# Patient Record
Sex: MALE | Race: WHITE | NOT HISPANIC OR LATINO | Employment: FULL TIME | URBAN - METROPOLITAN AREA
[De-identification: names, ages, dates, MRNs, and addresses within clinical notes are randomized per-mention and may not be internally consistent; named-entity substitution may affect disease eponyms.]

---

## 2017-02-06 ENCOUNTER — OFFICE VISIT (OUTPATIENT)
Dept: FAMILY MEDICINE | Facility: CLINIC | Age: 27
End: 2017-02-06
Payer: COMMERCIAL

## 2017-02-06 VITALS
HEIGHT: 73 IN | HEART RATE: 75 BPM | BODY MASS INDEX: 22.13 KG/M2 | TEMPERATURE: 98.1 F | WEIGHT: 167 LBS | DIASTOLIC BLOOD PRESSURE: 80 MMHG | SYSTOLIC BLOOD PRESSURE: 118 MMHG

## 2017-02-06 DIAGNOSIS — Z00.00 ROUTINE GENERAL MEDICAL EXAMINATION AT A HEALTH CARE FACILITY: Primary | ICD-10-CM

## 2017-02-06 DIAGNOSIS — R35.0 URINARY FREQUENCY: ICD-10-CM

## 2017-02-06 DIAGNOSIS — I78.1 NON-NEOPLASTIC NEVUS: ICD-10-CM

## 2017-02-06 DIAGNOSIS — Z23 NEED FOR VACCINATION: ICD-10-CM

## 2017-02-06 DIAGNOSIS — N39.0 URINARY TRACT INFECTION WITH HEMATURIA, SITE UNSPECIFIED: ICD-10-CM

## 2017-02-06 DIAGNOSIS — R31.9 URINARY TRACT INFECTION WITH HEMATURIA, SITE UNSPECIFIED: ICD-10-CM

## 2017-02-06 LAB
ALBUMIN UR-MCNC: ABNORMAL MG/DL
APPEARANCE UR: CLEAR
BACTERIA #/AREA URNS HPF: ABNORMAL /HPF
BILIRUB UR QL STRIP: NEGATIVE
COLOR UR AUTO: YELLOW
GLUCOSE UR STRIP-MCNC: NEGATIVE MG/DL
HGB UR QL STRIP: NEGATIVE
KETONES UR STRIP-MCNC: NEGATIVE MG/DL
LEUKOCYTE ESTERASE UR QL STRIP: ABNORMAL
MUCOUS THREADS #/AREA URNS LPF: PRESENT /LPF
NITRATE UR QL: NEGATIVE
NON-SQ EPI CELLS #/AREA URNS LPF: ABNORMAL /LPF
PH UR STRIP: 8.5 PH (ref 5–7)
RBC #/AREA URNS AUTO: ABNORMAL /HPF (ref 0–2)
SP GR UR STRIP: 1.02 (ref 1–1.03)
URN SPEC COLLECT METH UR: ABNORMAL
UROBILINOGEN UR STRIP-ACNC: 0.2 EU/DL (ref 0.2–1)
WBC #/AREA URNS AUTO: ABNORMAL /HPF (ref 0–2)

## 2017-02-06 PROCEDURE — 90715 TDAP VACCINE 7 YRS/> IM: CPT | Performed by: FAMILY MEDICINE

## 2017-02-06 PROCEDURE — 90686 IIV4 VACC NO PRSV 0.5 ML IM: CPT | Performed by: FAMILY MEDICINE

## 2017-02-06 PROCEDURE — 99212 OFFICE O/P EST SF 10 MIN: CPT | Mod: 25 | Performed by: FAMILY MEDICINE

## 2017-02-06 PROCEDURE — 90472 IMMUNIZATION ADMIN EACH ADD: CPT | Performed by: FAMILY MEDICINE

## 2017-02-06 PROCEDURE — 87086 URINE CULTURE/COLONY COUNT: CPT | Performed by: FAMILY MEDICINE

## 2017-02-06 PROCEDURE — 99385 PREV VISIT NEW AGE 18-39: CPT | Mod: 25 | Performed by: FAMILY MEDICINE

## 2017-02-06 PROCEDURE — 81001 URINALYSIS AUTO W/SCOPE: CPT | Performed by: FAMILY MEDICINE

## 2017-02-06 PROCEDURE — 90471 IMMUNIZATION ADMIN: CPT | Performed by: FAMILY MEDICINE

## 2017-02-06 RX ORDER — CIPROFLOXACIN 500 MG/1
500 TABLET, FILM COATED ORAL 2 TIMES DAILY
Qty: 14 TABLET | Refills: 0 | Status: SHIPPED | OUTPATIENT
Start: 2017-02-06 | End: 2017-02-13

## 2017-02-06 NOTE — MR AVS SNAPSHOT
After Visit Summary   2/6/2017    Nathen Mcdermott    MRN: [id number]           Patient Information     Date Of Birth          1990        Visit Information        Provider Department      2/6/2017 8:00 AM Yoon Hahn MD [facility]        Today's Diagnoses     Routine general medical examination at a health care facility    -  1     Urinary frequency         Non-neoplastic nevus         Nonspecific finding on examination of urine         Need for vaccination         Need for prophylactic vaccination and inoculation against influenza         Urinary tract infection with hematuria, site unspecified           Care Instructions      Preventive Health Recommendations  Male Ages 26 - 39    Yearly exam:             See your health care provider every year in order to  o   Review health changes.   o   Discuss preventive care.    o   Review your medicines if your doctor has prescribed any.    You should be tested each year for STDs (sexually transmitted diseases), if you re at risk.     After age 35, talk to your provider about cholesterol testing. If you are at risk for heart disease, have your cholesterol tested at least every 5 years.     If you are at risk for diabetes, you should have a diabetes test (fasting glucose).  Shots: Get a flu shot each year. Get a tetanus shot every 10 years.     Nutrition:    Eat at least 5 servings of fruits and vegetables daily.     Eat whole-grain bread, whole-wheat pasta and brown rice instead of white grains and rice.     Talk to your provider about Calcium and Vitamin D.     Lifestyle    Exercise for at least 150 minutes a week (30 minutes a day, 5 days a week). This will help you control your weight and prevent disease.     Limit alcohol to one drink per day.     No smoking.     Wear sunscreen to prevent skin cancer.     See your dentist every six months for an exam and cleaning.             Follow-ups after your visit        Additional Services      SKIN CARE REFERRAL       Your provider has referred you to: FM: [facility] [place] [phone]   [email or web address]     Please be aware that coverage of these services is subject to the terms and limitations of your health insurance plan.  Please check with your insurance prior to the appointment to ensure appropriate coverage for any services considered cosmetic in nature or not medically necessary.    Please bring the following with you to your appointment:    (1) Any X-Rays, CTs or MRIs which have been performed.  Contact the facility where they were done to arrange for  prior to your scheduled appointment.  Any new CT, MRI or other procedures ordered by your specialist must be performed at a [facility] facility or coordinated by your clinic's referral office.  (2) List of current medications  (3) This referral request   (4) Any documents/labs given to you for this referral                  Who to contact     If you have questions or need follow up information about today's clinic visit or your schedule please contact [facility] [place] directly at [phone].  Normal or non-critical lab and imaging results will be communicated to you by MyChart, letter or phone within 4 business days after the clinic has received the results. If you do not hear from us within 7 days, please contact the clinic through [employer] or phone. If you have a critical or abnormal lab result, we will notify you by phone as soon as possible.  Submit refill requests through [employer] or call your pharmacy and they will forward the refill request to us. Please allow 3 business days for your refill to be completed.          Additional Information About Your Visit        [employer] Information     [employer] lets you send messages to your doctor, view your test results, renew your prescriptions, schedule appointments and more. To sign up, go to [email or web address] Click on \"Log in\" on the left side of the screen, which will take you to the Welcome page. Then click on \"Sign up Now\" on the right side of the page.     You will be asked to enter the access code listed below, as well as some personal information. Please follow the directions to create your username and password.     Your access code is: [email or web address]  Expires: 2017  9:14 AM     Your access code will  in 90 days. If you need help or a new code, please call your [facility] clinic or [phone].        Care EveryWhere ID     This is your Care EveryWhere ID. This could be used by other organizations to access your [facility] medical records  [id number]        Your Vitals Were     Pulse Temperature Height BMI (Body Mass Index)          75 98.1  F (36.7  C) (Tympanic) 6' 1\" (1.854 m) 22.04 kg/m2         Blood Pressure from Last 3 Encounters:   17 118/80    Weight from Last 3 Encounters:   17 167 lb (75.751 kg)              We Performed the Following     1st  Administration  [23600]     Each additional admin.  (Right click and add QUANTITY)  [28168]     FLU VAC, SPLIT VIRUS IM > 3 YO (QUADRIVALENT) [36894]     SKIN CARE REFERRAL     TDAP (ADACEL AGES 11-64) [94723.002]     UA reflex to Microscopic     Urine Culture Aerobic Bacterial     Urine Microscopic          Today's Medication Changes          These changes are accurate as of: 17  9:14 AM.  If you have any questions, ask your nurse or doctor.               Start taking these medicines.        Dose/Directions    ciprofloxacin 500 MG tablet   Commonly known as:  CIPRO   Used for:  Urinary tract infection with hematuria, site unspecified   Started by:  Yoon Hahn MD        Dose:  500 mg   Take 1 tablet (500 mg) by mouth 2 times daily for 7 days   Quantity:  14 tablet   Refills:  0            Where to get your medicines      These medications were sent to [id number] - [place], [place] - [address]  AT [facility] OF [facility] [address] " [facility]  [address], [place] [address]     Phone:  [phone]    - ciprofloxacin 500 MG tablet             Primary Care Provider    None Specified       No primary provider on file.        Thank you!     Thank you for choosing [facility] [place]  for your care. Our goal is always to provide you with excellent care. Hearing back from our patients is one way we can continue to improve our services. Please take a few minutes to complete the written survey that you may receive in the mail after your visit with us. Thank you!             Your Updated Medication List - Protect others around you: Learn how to safely use, store and throw away your medicines at www.disposemymeds.org.          This list is accurate as of: 2/6/17  9:14 AM.  Always use your most recent med list.                   Brand Name Dispense Instructions for use    ciprofloxacin 500 MG tablet    CIPRO    14 tablet    Take 1 tablet (500 mg) by mouth 2 times daily for 7 days

## 2017-02-06 NOTE — PROGRESS NOTES
"Chief Complaint   Patient presents with     Physical     not fasting       Initial /80 mmHg  Pulse 75  Temp(Src) 98.1  F (36.7  C) (Tympanic)  Ht 6' 1\" (1.854 m)  Wt 167 lb (75.751 kg)  BMI 22.04 kg/m2 Estimated body mass index is 22.04 kg/(m^2) as calculated from the following:    Height as of this encounter: 6' 1\" (1.854 m).    Weight as of this encounter: 167 lb (75.751 kg).  Medication Reconciliation: complete  SUBJECTIVE:     CC: Nathen Mcdermott is an 26 year old male who presents for preventative health visit.     Healthy Habits:    Do you get at least three servings of calcium containing foods daily (dairy, green leafy vegetables, etc.)? yes    Amount of exercise or daily activities, outside of work: few days a week for 1 hour     Problems taking medications regularly not applicable    Medication side effects: No    Have you had an eye exam in the past two years? yes    Do you see a dentist twice per year? yes    Do you have sleep apnea, excessive snoring or daytime drowsiness?no        Urinary () - Male  Duration of complaint: 1 month   Painful urination, high frequency   Denies any penile discharge. Denies any rash. He sexually active. Denies any concerns about STDs.      C/o off and on sneezing. When he sneezes, he feels discomfort in the middle of the chest. It resolved quickly.   He does have seasonal allergies for which he uses as needed OTC anti allergy medication.    Today's PHQ-2 Score:   PHQ-2 ( 1999 Pfizer) 2/6/2017   Q1: Little interest or pleasure in doing things 0   Q2: Feeling down, depressed or hopeless 0   PHQ-2 Score 0       Abuse: Current or Past(Physical, Sexual or Emotional)- No  Do you feel safe in your environment - Yes    Social History   Substance Use Topics     Smoking status: Never Smoker      Smokeless tobacco: Never Used     Alcohol Use: 0.0 oz/week     0 Standard drinks or equivalent per week      Comment: a couple times a week     Uses alcohol a couple times a week    " "   Last PSA: No results found for: PSA    No results for input(s): CHOL, HDL, LDL, TRIG, CHOLHDLRATIO, NHDL in the last 31684 hours.    Reviewed orders with patient. Reviewed health maintenance and updated orders accordingly - Yes    All Histories reviewed and updated in Epic.      ROS:  C: NEGATIVE for fever, chills, change in weight  I: NEGATIVE for worrisome rashes, moles or lesions  E: NEGATIVE for vision changes or irritation  ENT: NEGATIVE for ear, mouth and throat problems  R: NEGATIVE for significant cough or SOB  CV: NEGATIVE for chest pain, palpitations or peripheral edema  GI: NEGATIVE for nausea, abdominal pain, heartburn, or change in bowel habits   male: negative for dysuria, hematuria, decreased urinary stream, erectile dysfunction, urethral discharge  M: NEGATIVE for significant arthralgias or myalgia  N: NEGATIVE for weakness, dizziness or paresthesias  P: NEGATIVE for changes in mood or affect    There is no problem list on file for this patient.    Past Surgical History   Procedure Laterality Date     Dental surgery         Social History   Substance Use Topics     Smoking status: Never Smoker      Smokeless tobacco: Never Used     Alcohol Use: 0.0 oz/week     0 Standard drinks or equivalent per week      Comment: a couple times a week     Family History   Problem Relation Age of Onset     DIABETES Maternal Grandmother          Current Outpatient Prescriptions   Medication Sig Dispense Refill     ciprofloxacin (CIPRO) 500 MG tablet Take 1 tablet (500 mg) by mouth 2 times daily for 7 days 14 tablet 0     No Known Allergies  OBJECTIVE:     /80 mmHg  Pulse 75  Temp(Src) 98.1  F (36.7  C) (Tympanic)  Ht 6' 1\" (1.854 m)  Wt 167 lb (75.751 kg)  BMI 22.04 kg/m2  EXAM:  GENERAL: healthy, alert and no distress  EYES: Eyes grossly normal to inspection, PERRL and conjunctivae and sclerae normal  HENT: ear canals and TM's normal, nose and mouth without ulcers or lesions  NECK: no adenopathy, no " asymmetry, masses, or scars and thyroid normal to palpation  RESP: lungs clear to auscultation - no rales, rhonchi or wheezes  CV: regular rate and rhythm, normal S1 S2, no S3 or S4, no murmur, click or rub, no peripheral edema and peripheral pulses strong  ABDOMEN: soft, nontender, no hepatosplenomegaly, no masses and bowel sounds normal  MS: no gross musculoskeletal defects noted, no edema  SKIN: multiple nevi noted on the skin.  NEURO: Normal strength and tone, mentation intact and speech normal  PSYCH: mentation appears normal, affect normal/bright  Results for orders placed or performed in visit on 02/06/17   UA reflex to Microscopic   Result Value Ref Range    Color Urine Yellow     Appearance Urine Clear     Glucose Urine Negative NEG mg/dL    Bilirubin Urine Negative NEG    Ketones Urine Negative NEG mg/dL    Specific Gravity Urine 1.020 1.003 - 1.035    Blood Urine Negative NEG    pH Urine 8.5 (H) 5.0 - 7.0 pH    Protein Albumin Urine Trace (A) NEG mg/dL    Urobilinogen Urine 0.2 0.2 - 1.0 EU/dL    Nitrite Urine Negative NEG    Leukocyte Esterase Urine Trace (A) NEG    Source Midstream Urine    Urine Microscopic   Result Value Ref Range    WBC Urine 10-25 (A) 0 - 2 /HPF    RBC Urine 2-5 (A) 0 - 2 /HPF    Squamous Epithelial /LPF Urine Few FEW /LPF    Bacteria Urine Few (A) NEG /HPF    Mucous Urine Present (A) NEG /LPF       ASSESSMENT/PLAN:     1. Routine general medical examination at a health care facility  Annual wellness visits recommended    2. Urinary frequency    - UA reflex to Microscopic    3. Urinary tract infection with hematuria, site unspecified  Urinary tract infection noted. Recommended to stay well hydrated. Ciprofloxacin ordered. Await the urine culture results.  - Urine Culture Aerobic Bacterial  - ciprofloxacin (CIPRO) 500 MG tablet; Take 1 tablet (500 mg) by mouth 2 times daily for 7 days  Dispense: 14 tablet; Refill: 0    4. Non-neoplastic nevus  Recommended to see skin care clinic  -  "SKIN CARE REFERRAL    5. Need for vaccination    - TDAP (ADACEL AGES 11-64) [60443.002]  - 1st  Administration  [66318]  - Each additional admin.  (Right click and add QUANTITY)  [42671]  - FLU VAC, SPLIT VIRUS IM > 3 YO (QUADRIVALENT) [11181]    6- Seasonal allergies- recommended to use over-the-counter antihistamine as needed    COUNSELING:  Reviewed preventive health counseling, as reflected in patient instructions       Regular exercise       Healthy diet/nutrition         reports that he has never smoked. He has never used smokeless tobacco.    Estimated body mass index is 22.04 kg/(m^2) as calculated from the following:    Height as of this encounter: 6' 1\" (1.854 m).    Weight as of this encounter: 167 lb (75.751 kg).       Counseling Resources:  ATP IV Guidelines  Pooled Cohorts Equation Calculator  FRAX Risk Assessment  ICSI Preventive Guidelines  Dietary Guidelines for Americans, 2010  USDA's MyPlate  ASA Prophylaxis  Lung CA Screening    Yoon Hahn MD  [facility] [place]  "

## 2017-02-06 NOTE — PROGRESS NOTES
Injectable Influenza Immunization Documentation    1.  Is the person to be vaccinated sick today?  No    2. Does the person to be vaccinated have an allergy to eggs or to a component of the vaccine?  No    3. Has the person to be vaccinated today ever had a serious reaction to influenza vaccine in the past?  No    4. Has the person to be vaccinated ever had Guillain-Norris syndrome?  No     Form completed by Sarah Severson, CMA

## 2017-02-07 LAB
BACTERIA SPEC CULT: NORMAL
MICRO REPORT STATUS: NORMAL
SPECIMEN SOURCE: NORMAL

## 2019-05-02 ENCOUNTER — TELEPHONE (OUTPATIENT)
Dept: FAMILY MEDICINE | Facility: CLINIC | Age: 29
End: 2019-05-02

## 2019-05-02 NOTE — TELEPHONE ENCOUNTER
5/2/2019    Call Regarding ReattributionPhysical    Attempt 1    Message     Comments: no voicemail      Outreach   LR

## 2019-12-12 NOTE — TELEPHONE ENCOUNTER
12/12/2019    Call Regarding ReattributionPhysical       Attempt 3    Message on voicemail    Comments:       Outreach   RUTHIE

## 2021-04-24 ENCOUNTER — HEALTH MAINTENANCE LETTER (OUTPATIENT)
Age: 31
End: 2021-04-24

## 2021-10-09 ENCOUNTER — HEALTH MAINTENANCE LETTER (OUTPATIENT)
Age: 31
End: 2021-10-09

## 2022-02-14 NOTE — TELEPHONE ENCOUNTER
12/4/2019    Call Regarding ReattributionPhysical    Attempt 2    Comments: Left MARGARET Fernando       Thalidomide Counseling: I discussed with the patient the risks of thalidomide including but not limited to birth defects, anxiety, weakness, chest pain, dizziness, cough and severe allergy.

## 2022-05-21 ENCOUNTER — HEALTH MAINTENANCE LETTER (OUTPATIENT)
Age: 32
End: 2022-05-21

## 2022-09-11 ENCOUNTER — HEALTH MAINTENANCE LETTER (OUTPATIENT)
Age: 32
End: 2022-09-11

## 2023-06-03 ENCOUNTER — HEALTH MAINTENANCE LETTER (OUTPATIENT)
Age: 33
End: 2023-06-03